# Patient Record
Sex: MALE | URBAN - METROPOLITAN AREA
[De-identification: names, ages, dates, MRNs, and addresses within clinical notes are randomized per-mention and may not be internally consistent; named-entity substitution may affect disease eponyms.]

---

## 2021-10-15 ENCOUNTER — APPOINTMENT (OUTPATIENT)
Dept: URBAN - METROPOLITAN AREA CLINIC 269 | Age: 41
Setting detail: DERMATOLOGY
End: 2021-10-15

## 2021-10-15 DIAGNOSIS — Z79.899 OTHER LONG TERM (CURRENT) DRUG THERAPY: ICD-10-CM

## 2021-10-15 DIAGNOSIS — B35.1 TINEA UNGUIUM: ICD-10-CM

## 2021-10-15 PROCEDURE — OTHER ORDER TESTS: OTHER

## 2021-10-15 PROCEDURE — OTHER PRESCRIPTION MEDICATION MANAGEMENT: OTHER

## 2021-11-16 ENCOUNTER — RX ONLY (RX ONLY)
Age: 41
End: 2021-11-16

## 2021-11-16 RX ORDER — TERBINAFINE HYDROCHLORIDE 250 MG/1
TABLET ORAL QD
Qty: 42 | Refills: 0 | Status: ERX | COMMUNITY
Start: 2021-11-16

## 2022-07-26 ENCOUNTER — APPOINTMENT (OUTPATIENT)
Age: 42
Setting detail: DERMATOLOGY
End: 2022-07-27

## 2022-07-26 DIAGNOSIS — B35.1 TINEA UNGUIUM: ICD-10-CM

## 2022-07-26 PROCEDURE — OTHER COUNSELING: OTHER

## 2022-07-26 PROCEDURE — 99203 OFFICE O/P NEW LOW 30 MIN: CPT

## 2022-07-26 PROCEDURE — OTHER TREATMENT REGIMEN: OTHER

## 2022-07-26 PROCEDURE — OTHER PRESCRIPTION: OTHER

## 2022-07-26 PROCEDURE — OTHER FOLLOW UP FOR NEXT VISIT: OTHER

## 2022-07-26 PROCEDURE — OTHER ORDER TESTS: OTHER

## 2022-07-26 RX ORDER — TERBINAFINE HYDROCHLORIDE 250 MG/1
250 MG TABLET ORAL DAILY
Qty: 30 | Refills: 0 | Status: ERX | COMMUNITY
Start: 2022-07-26

## 2022-07-26 RX ORDER — EFINACONAZOLE 100 MG/ML
THIN COAT SOLUTION TOPICAL QD
Qty: 4 | Refills: 3 | Status: ERX | COMMUNITY
Start: 2022-07-26

## 2022-07-26 ASSESSMENT — LOCATION ZONE DERM: LOCATION ZONE: TOENAIL

## 2022-07-26 ASSESSMENT — LOCATION DETAILED DESCRIPTION DERM
LOCATION DETAILED: RIGHT GREAT TOENAIL
LOCATION DETAILED: LEFT GREAT TOENAIL

## 2022-07-26 ASSESSMENT — LOCATION SIMPLE DESCRIPTION DERM
LOCATION SIMPLE: RIGHT GREAT TOE
LOCATION SIMPLE: LEFT GREAT TOE

## 2022-07-26 ASSESSMENT — NAIL INVOLVEMENT PERCENT: % OF NAIL(S) INVOLVED WITH INFECTION: 10

## 2022-07-26 NOTE — HPI: INFECTION (ONYCHOMYCOSIS)
How Severe Is It?: mild
Is This A New Presentation, Or A Follow-Up?: Nail Infection
Additional History: Patient here to establish care. He did see previous dermatology 6-8 ago and was treated with what sounds like two months of oral terbinafine. The oral antifungal did seem to make a difference but he feels like he needs a little more medication or a little longer treatment. Toenails or for the most part completely asymptomatic although he has occasional discomfort if they get too thick

## 2022-07-26 NOTE — PROCEDURE: TREATMENT REGIMEN
Plan: This appears consistent with onychomycosis. From my point of view it only appears that the two great toenails are affected but patient feels that the other toenails might be discolored as well. In light of that he did elect to start with both the oral medication and the topical medication. He will call if there are any cost related issues with the topical medication. I recommend follow up in person in three months to check for progress to determine if a fourth month of oral medication might be necessary. Patient will check his blood work 5 to 7 days before running out of his first prescription so we can send in a second prescription. Discussed with patient starting Terbinafine and Jublia. Lab slip given today for hepatic test.
Initiate Treatment: Terbinafine and Jublia
Detail Level: Detailed

## 2022-08-29 ENCOUNTER — APPOINTMENT (OUTPATIENT)
Age: 42
Setting detail: DERMATOLOGY
End: 2022-08-30

## 2022-08-29 ENCOUNTER — APPOINTMENT (OUTPATIENT)
Age: 42
Setting detail: DERMATOLOGY
End: 2022-08-29

## 2022-08-29 DIAGNOSIS — B35.1 TINEA UNGUIUM: ICD-10-CM

## 2022-08-29 PROCEDURE — OTHER ORDER TESTS: OTHER

## 2022-08-29 PROCEDURE — OTHER COUNSELING: OTHER

## 2022-08-29 PROCEDURE — OTHER TREATMENT REGIMEN: OTHER

## 2022-08-29 PROCEDURE — OTHER FOLLOW UP FOR NEXT VISIT: OTHER

## 2022-08-29 RX ORDER — TERBINAFINE HYDROCHLORIDE 250 MG/1
250 MG TABLET ORAL DAILY
Qty: 30 | Refills: 0 | Status: ERX

## 2022-08-29 ASSESSMENT — LOCATION ZONE DERM: LOCATION ZONE: TOENAIL

## 2022-08-29 ASSESSMENT — LOCATION DETAILED DESCRIPTION DERM
LOCATION DETAILED: RIGHT GREAT TOENAIL
LOCATION DETAILED: LEFT GREAT TOENAIL

## 2022-08-29 ASSESSMENT — LOCATION SIMPLE DESCRIPTION DERM
LOCATION SIMPLE: RIGHT GREAT TOE
LOCATION SIMPLE: LEFT GREAT TOE

## 2022-08-29 NOTE — PROCEDURE: TREATMENT REGIMEN
Plan: Patient has completed first month of Terbinafine and blood work results were received. Patient will continue with second month of Terbinafine taking 250 mg once daily. Will email patient lab slip with instructions to have blood panel completed prior to sending in prescription for month 3.
Continue Regimen: Terbinafine and Jublia
Detail Level: Detailed

## 2022-08-30 ENCOUNTER — APPOINTMENT (OUTPATIENT)
Age: 42
Setting detail: DERMATOLOGY
End: 2022-08-30

## 2022-08-30 DIAGNOSIS — B35.1 TINEA UNGUIUM: ICD-10-CM

## 2022-08-30 PROCEDURE — OTHER COUNSELING: OTHER

## 2022-08-30 PROCEDURE — OTHER TREATMENT REGIMEN: OTHER

## 2022-08-30 PROCEDURE — OTHER ORDER TESTS: OTHER

## 2022-08-30 PROCEDURE — OTHER FOLLOW UP FOR NEXT VISIT: OTHER

## 2022-08-30 ASSESSMENT — LOCATION DETAILED DESCRIPTION DERM
LOCATION DETAILED: RIGHT GREAT TOENAIL
LOCATION DETAILED: LEFT GREAT TOENAIL

## 2022-08-30 ASSESSMENT — LOCATION SIMPLE DESCRIPTION DERM
LOCATION SIMPLE: RIGHT GREAT TOE
LOCATION SIMPLE: LEFT GREAT TOE

## 2022-08-30 ASSESSMENT — LOCATION ZONE DERM: LOCATION ZONE: TOENAIL

## 2022-08-30 NOTE — PROCEDURE: TREATMENT REGIMEN
Detail Level: Detailed
Plan: Patient is starting his second month of medication today. We will email him a lab order to check his bloodwork in three weeks for his third and final prescription
Continue Regimen: Terbinafine and Jublia